# Patient Record
Sex: MALE | Race: WHITE | NOT HISPANIC OR LATINO | Employment: STUDENT | URBAN - METROPOLITAN AREA
[De-identification: names, ages, dates, MRNs, and addresses within clinical notes are randomized per-mention and may not be internally consistent; named-entity substitution may affect disease eponyms.]

---
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
## 2022-02-22 ENCOUNTER — PATIENT MESSAGE (OUTPATIENT)
Dept: RESEARCH | Facility: HOSPITAL | Age: 22
End: 2022-02-22
Payer: COMMERCIAL

## 2022-09-22 ENCOUNTER — HOSPITAL ENCOUNTER (EMERGENCY)
Facility: OTHER | Age: 22
Discharge: HOME | End: 2022-09-22
Attending: EMERGENCY MEDICINE
Payer: COMMERCIAL

## 2022-09-22 VITALS
DIASTOLIC BLOOD PRESSURE: 76 MMHG | HEART RATE: 93 BPM | HEIGHT: 72 IN | SYSTOLIC BLOOD PRESSURE: 137 MMHG | OXYGEN SATURATION: 98 % | RESPIRATION RATE: 18 BRPM | BODY MASS INDEX: 22.35 KG/M2 | TEMPERATURE: 98 F | WEIGHT: 165 LBS

## 2022-09-22 DIAGNOSIS — H92.03 OTALGIA OF BOTH EARS: ICD-10-CM

## 2022-09-22 DIAGNOSIS — H61.23 BILATERAL IMPACTED CERUMEN: ICD-10-CM

## 2022-09-22 DIAGNOSIS — J32.9 SINUSITIS, UNSPECIFIED CHRONICITY, UNSPECIFIED LOCATION: ICD-10-CM

## 2022-09-22 DIAGNOSIS — J02.9 PHARYNGITIS, UNSPECIFIED ETIOLOGY: Primary | ICD-10-CM

## 2022-09-22 LAB
CTP QC/QA: YES
CTP QC/QA: YES
GROUP A STREP, MOLECULAR: NEGATIVE
POC MOLECULAR INFLUENZA A AGN: NEGATIVE
POC MOLECULAR INFLUENZA B AGN: NEGATIVE
SARS-COV-2 RDRP RESP QL NAA+PROBE: NEGATIVE

## 2022-09-22 PROCEDURE — U0002 COVID-19 LAB TEST NON-CDC: HCPCS | Performed by: NURSE PRACTITIONER

## 2022-09-22 PROCEDURE — 87502 INFLUENZA DNA AMP PROBE: CPT

## 2022-09-22 PROCEDURE — 99284 EMERGENCY DEPT VISIT MOD MDM: CPT | Mod: 25

## 2022-09-22 PROCEDURE — 87651 STREP A DNA AMP PROBE: CPT | Performed by: NURSE PRACTITIONER

## 2022-09-22 RX ORDER — AMOXICILLIN AND CLAVULANATE POTASSIUM 875; 125 MG/1; MG/1
1 TABLET, FILM COATED ORAL EVERY 12 HOURS
Qty: 14 TABLET | Refills: 0 | Status: SHIPPED | OUTPATIENT
Start: 2022-09-22 | End: 2022-09-29

## 2022-09-22 RX ORDER — FLUTICASONE PROPIONATE 50 MCG
2 SPRAY, SUSPENSION (ML) NASAL DAILY
Qty: 9.9 ML | Refills: 0 | Status: SHIPPED | OUTPATIENT
Start: 2022-09-22

## 2022-09-23 NOTE — ED TRIAGE NOTES
Pt arrived to ED with c/o ear pain, soar throat for a week. Pt denies any recent trauma. Pt AAOx4, NAD noted.

## 2022-09-23 NOTE — ED PROVIDER NOTES
Encounter Date: 9/22/2022       History     Chief Complaint   Patient presents with    Otalgia     Pt c.o left earache onset 1 week ago. Pt states it feels like it is clogged up. Pt c.o sore throat and headache.  AAO x 3 nadn skin w.d     COVID-19 Concerns     Chief complaint: Ear pain    22-year-old male presents for evaluation of bilateral ear pain, worse on the left.  Also reports nasal congestion, runny nose, and sore throat.  Symptoms have been present for almost 2 weeks.  No fever.  No cough.    This is the extent of patient's complaints for this ER encounter.     The history is provided by the patient.   Review of patient's allergies indicates:  No Known Allergies  No past medical history on file.  No past surgical history on file.  No family history on file.  Social History     Tobacco Use    Smoking status: Never   Substance Use Topics    Alcohol use: Yes    Drug use: Never     Review of Systems   Constitutional:  Negative for fever.   HENT:  Positive for congestion, ear pain, rhinorrhea, sinus pressure and sore throat. Negative for ear discharge.    Respiratory:  Negative for cough and shortness of breath.    Cardiovascular:  Negative for chest pain.   Gastrointestinal:  Negative for abdominal pain.   Genitourinary:  Negative for difficulty urinating.   Musculoskeletal:  Negative for arthralgias, back pain, myalgias and neck pain.   Skin:  Negative for rash and wound.   Neurological:  Positive for headaches. Negative for weakness.   Psychiatric/Behavioral: Negative.       Physical Exam     Initial Vitals [09/22/22 1851]   BP Pulse Resp Temp SpO2   137/76 93 18 98.4 °F (36.9 °C) 98 %      MAP       --         Physical Exam    Nursing note and vitals reviewed.  Constitutional: No distress.   HENT:   Head: Normocephalic and atraumatic.   Nose: Rhinorrhea present.   Mouth/Throat: Mucous membranes are normal. Posterior oropharyngeal erythema present. No oropharyngeal exudate, posterior oropharyngeal edema or  tonsillar abscesses.   Unable to visualize bilateral TMs due to cerumen impaction.   Eyes: Conjunctivae, EOM and lids are normal. Right pupil is round. Left pupil is round. Pupils are equal.   Neck: Neck supple.   Cardiovascular:  Normal rate, regular rhythm and normal heart sounds.           Pulmonary/Chest: Effort normal and breath sounds normal. No respiratory distress.   Musculoskeletal:         General: Normal range of motion.      Cervical back: Neck supple.     Neurological: He is alert and oriented to person, place, and time. He has normal strength.   Skin: Skin is warm and dry. No rash noted.   Psychiatric: He has a normal mood and affect. His behavior is normal.       ED Course   Procedures  Labs Reviewed   GROUP A STREP, MOLECULAR   SARS-COV-2 RDRP GENE   POCT INFLUENZA A/B MOLECULAR          Imaging Results    None          Medications - No data to display  Medical Decision Making:   Initial Assessment:   22-year-old male presents for evaluation ear pain, nasal congestion, and sore throat for almost 2 weeks.  ED Management:  Negative flu, COVID, strep.  Erythema noted to the posterior throat without swelling.  No signs of peritonsillar abscess.  No difficulty swallowing or handling secretions.  No shortness of breath.  Bilateral cerumen impaction noted, unable to view TMs.    Given symptoms have been present for greater than 10 days, concerns for bacterial sinusitis.  Plan to treat with Augmentin antibiotics for the treatment of sinusitis and pharyngitis.  This will provide additional coverage if acute otitis media is present, but unable to identify due to wax.  Additionally, Flonase prescribed.  Debrox ordered and patient educated on the importance of follow-up with PCP or urgent care for wax removal.  Magic mouthwash as needed for throat pain.    Patient/caregiver voices understanding and feels comfortable with discharge plan.    The patient/caregiver acknowledges that close follow up with medical  provider is required. Instructed to follow up with PCP within 2 days. Patient/caregiver was given specific return precautions. The patient/caregiver agrees to comply with all instruction and directions given in the ER.                         Clinical Impression:   Final diagnoses:  [H92.03] Otalgia of both ears  [H61.23] Bilateral impacted cerumen  [J32.9] Sinusitis, unspecified chronicity, unspecified location  [J02.9] Pharyngitis, unspecified etiology (Primary)      ED Disposition Condition    Discharge Stable          ED Prescriptions       Medication Sig Dispense Start Date End Date Auth. Provider    amoxicillin-clavulanate 875-125mg (AUGMENTIN) 875-125 mg per tablet Take 1 tablet by mouth every 12 (twelve) hours. for 7 days 14 tablet 9/22/2022 9/29/2022 Oneyda Shields NP    (Magic mouthwash) 1:1:1 diphenhydramine(Benadryl) 12.5mg/5ml liq, aluminum & magnesium hydroxide-simethicone (Maalox), LIDOcaine viscous 2% Swish and spit 5 mLs every 6 (six) hours as needed (mouth/throat pain). 90 mL 9/22/2022 -- Oneyda Shields NP    fluticasone propionate (FLONASE) 50 mcg/actuation nasal spray 2 sprays (100 mcg total) by Each Nostril route once daily. 9.9 mL 9/22/2022 -- Oneyda Shields NP    carbamide peroxide (DEBROX) 6.5 % otic solution Place 5 drops into both ears as needed (Ear wax impaction). May use 2 times per day up to 4 days. 15 mL 9/22/2022 -- Oneyda Shields NP          Follow-up Information       Follow up With Specialties Details Why Contact Info    Primary care  Schedule an appointment as soon as possible for a visit in 2 days               Oneyda Shields NP  09/22/22 2123

## 2022-09-23 NOTE — DISCHARGE INSTRUCTIONS
Your flu swab, COVID swab, and strep swab were negative.    I think you likely have a sinus infection.  Because your symptoms have been ongoing for more than 10 days, antibiotics are prescribed at discharge.  Augmentin twice a day x7 days. Flonase also prescribed.     Magic mouthwash for throat pain.    Debrox ear drops can help with wax impaction.  They also have over-the-counter kits that may help with this.  If you are unable to disimpact ear wax, follow-up with primary care or urgent care.    Follow up with PCP in 24-48 hours. Return to ER with worsening of symptoms.     Over the counter medications, Tylenol or ibuprofen, for pain as needed as directed on package insert. Drink plenty fluids. Get plenty rest. Wash hands frequently.

## 2022-10-31 ENCOUNTER — HOSPITAL ENCOUNTER (EMERGENCY)
Facility: OTHER | Age: 22
Discharge: HOME | End: 2022-10-31
Attending: EMERGENCY MEDICINE
Payer: COMMERCIAL

## 2022-10-31 VITALS
RESPIRATION RATE: 16 BRPM | HEIGHT: 72 IN | SYSTOLIC BLOOD PRESSURE: 121 MMHG | HEART RATE: 96 BPM | DIASTOLIC BLOOD PRESSURE: 70 MMHG | OXYGEN SATURATION: 97 % | TEMPERATURE: 99 F | BODY MASS INDEX: 23.03 KG/M2 | WEIGHT: 170 LBS

## 2022-10-31 DIAGNOSIS — J02.9 VIRAL PHARYNGITIS: Primary | ICD-10-CM

## 2022-10-31 PROCEDURE — 63600175 PHARM REV CODE 636 W HCPCS: Performed by: EMERGENCY MEDICINE

## 2022-10-31 PROCEDURE — 87635 SARS-COV-2 COVID-19 AMP PRB: CPT | Performed by: EMERGENCY MEDICINE

## 2022-10-31 PROCEDURE — 99284 EMERGENCY DEPT VISIT MOD MDM: CPT

## 2022-10-31 PROCEDURE — 87651 STREP A DNA AMP PROBE: CPT | Performed by: EMERGENCY MEDICINE

## 2022-10-31 PROCEDURE — 96372 THER/PROPH/DIAG INJ SC/IM: CPT | Performed by: EMERGENCY MEDICINE

## 2022-10-31 RX ORDER — DEXAMETHASONE SODIUM PHOSPHATE 4 MG/ML
8 INJECTION, SOLUTION INTRA-ARTICULAR; INTRALESIONAL; INTRAMUSCULAR; INTRAVENOUS; SOFT TISSUE
Status: COMPLETED | OUTPATIENT
Start: 2022-10-31 | End: 2022-10-31

## 2022-10-31 RX ORDER — IBUPROFEN 800 MG/1
800 TABLET ORAL EVERY 6 HOURS PRN
Qty: 30 TABLET | Refills: 0 | Status: SHIPPED | OUTPATIENT
Start: 2022-10-31

## 2022-10-31 RX ADMIN — DEXAMETHASONE SODIUM PHOSPHATE 8 MG: 4 INJECTION INTRA-ARTICULAR; INTRALESIONAL; INTRAMUSCULAR; INTRAVENOUS; SOFT TISSUE at 05:10

## 2022-10-31 NOTE — FIRST PROVIDER EVALUATION
Emergency Department TeleTriage Encounter Note      CHIEF COMPLAINT    Chief Complaint   Patient presents with    Influenza     Patient reports to ED  with complaints of body aches / chills , cough and congestion , sore throat and fatigue x 1 day. Patient afebrile at present. NAD noted.        VITAL SIGNS   Initial Vitals [10/31/22 1428]   BP Pulse Resp Temp SpO2   121/70 96 16 99.3 °F (37.4 °C) 97 %      MAP       --            ALLERGIES    Review of patient's allergies indicates:  No Known Allergies    PROVIDER TRIAGE NOTE  TeleTriage Note: Juice Burk, a nontoxic/well appearing, 22 y.o. male, presented to the ED with c/o sore throat, chills and body aches since yesterday.     All ED beds are full at present; patient notified of this status.  Patient seen and medically screened by Nurse Practitioner via teletriage. Orders initiated at triage to expedite care.  Patient is stable to return to the waiting room and will be placed in an ED bed when available.  Care will be transferred to an alternate provider when patient has been placed in an Exam Room from the [facility] for physical exam, additional orders, and disposition.  2:39 PM Sunita Cheatham DNP, FNP-C        ORDERS  Labs Reviewed   SARS-COV-2 RDRP GENE   POCT INFLUENZA A/B MOLECULAR       ED Orders (720h ago, onward)      Start Ordered     Status Ordering Provider    10/31/22 1441 10/31/22 1440  POCT COVID-19 Rapid Screening  Once         Ordered SUNITA CHEATHAM    10/31/22 1441 10/31/22 1440  POCT Influenza A/B Molecular  Once         Ordered SUNITA CHEATHAM              Virtual Visit Note: The provider triage portion of this emergency department evaluation and documentation was performed via [employer], a HIPAA-compliant telemedicine application, in concert with a tele-presenter in the room. A face to face patient evaluation with one of my colleagues will occur once the patient is placed in an emergency department room.      DISCLAIMER: This note was  prepared with [employer] voice recognition transcription software. Garbled syntax, mangled pronouns, and other bizarre constructions may be attributed to that software system.

## 2022-10-31 NOTE — ED TRIAGE NOTES
"Present to the er with c/o " I have an extremely sore throat, I have chills, I have body pain" " they said they think I have strep throat" symptom since 2 days, rates pain 9/10  "

## 2022-10-31 NOTE — ED PROVIDER NOTES
Encounter Date: 10/31/2022    SCRIBE #1 NOTE: I, Sola Bansal, am scribing for, and in the presence of,  Sean Vidal II, MD.     History     Chief Complaint   Patient presents with    Influenza     Patient reports to ED  with complaints of body aches / chills , cough and congestion , sore throat and fatigue x 1 day. Patient afebrile at present. NAD noted.      Time seen by provider: 3:02 PM    This is a 22 y.o. male who presents with complaint of sore throat for the past day. The patient also reports fevers, chills, congestion, cough, dyspnea, and myalgias. He denies associated nausea or vomiting. He also denies any sick contacts. The patient states that he is vaccinated against COVID-19. He denies any allergies to medications.     The history is provided by the patient.   Review of patient's allergies indicates:  No Known Allergies  History reviewed. No pertinent past medical history.  History reviewed. No pertinent surgical history.  History reviewed. No pertinent family history.  Social History     Tobacco Use    Smoking status: Never   Substance Use Topics    Alcohol use: Yes    Drug use: Never     Review of Systems   Constitutional:  Positive for chills and fever. Negative for diaphoresis.   HENT:  Positive for congestion and sore throat.    Eyes:  Negative for photophobia and visual disturbance.   Respiratory:  Positive for cough and shortness of breath.    Cardiovascular:  Negative for chest pain and leg swelling.   Gastrointestinal:  Negative for abdominal pain, blood in stool, constipation, diarrhea, nausea and vomiting.   Genitourinary:  Negative for dysuria, frequency, hematuria and urgency.   Musculoskeletal:  Positive for myalgias. Negative for neck pain and neck stiffness.   Skin:  Negative for rash and wound.   Neurological:  Negative for weakness, light-headedness, numbness and headaches.   Hematological:  Does not bruise/bleed easily.   Psychiatric/Behavioral:  Negative for confusion and  suicidal ideas.      Physical Exam     Initial Vitals [10/31/22 1428]   BP Pulse Resp Temp SpO2   121/70 96 16 99.3 °F (37.4 °C) 97 %      MAP       --         Physical Exam    Nursing note and vitals reviewed.  Constitutional: He appears well-developed and well-nourished. He is not diaphoretic. No distress.   Well appearing.   HENT:   Head: Normocephalic and atraumatic.   TM's are normal. Tonsils are erythematous. Patches of whiteish exudate. Uvula is midline. No asymmetry of peritonsillar area. Bilateral cervical adenopothy.   Eyes: Conjunctivae and EOM are normal. Pupils are equal, round, and reactive to light.   Neck: Neck supple.   Normal range of motion.  Cardiovascular:  Normal rate, regular rhythm and intact distal pulses.     Exam reveals no gallop and no friction rub.       No murmur heard.  Pulmonary/Chest: Breath sounds normal. No respiratory distress. He has no wheezes. He has no rhonchi. He has no rales.   Abdominal: Abdomen is soft. There is no abdominal tenderness.   Musculoskeletal:         General: No tenderness or edema. Normal range of motion.      Cervical back: Normal range of motion and neck supple.     Neurological: He is alert and oriented to person, place, and time.   Skin: Skin is warm and dry.   Psychiatric: He has a normal mood and affect. His behavior is normal. Judgment and thought content normal.       ED Course   Procedures  Labs Reviewed   GROUP A STREP, MOLECULAR   POCT INFLUENZA A/B MOLECULAR   SARS-COV-2 RDRP GENE    Narrative:     This test utilizes isothermal nucleic acid amplification   technology to detect the SARS-CoV-2 RdRp nucleic acid segment.   The analytical sensitivity (limit of detection) is 125 genome   equivalents/mL.   A POSITIVE result implies infection with the SARS-CoV-2 virus;   the patient is presumed to be contagious.     A NEGATIVE result means that SARS-CoV-2 nucleic acids are not   present above the limit of detection. A NEGATIVE result should be   treated  as presumptive. It does not rule out the possibility of   COVID-19 and should not be the sole basis for treatment decisions.   If COVID-19 is strongly suspected based on clinical and exposure   history, re-testing using an alternate molecular assay should be   considered.   This test is only for use under the Food and Drug   Administration s Emergency Use Authorization (EUA).   Commercial kits are provided by [employer].   Performance characteristics of the EUA have been independently   verified by [facility] Department of   Pathology and Laboratory Medicine.   _________________________________________________________________   The authorized Fact Sheet for Healthcare Providers and the authorized Fact   Sheet for Patients of the ID NOW COVID-19 are available on the FDA   website:     https://www.fda.gov/media/859893/download  https://www.fda.gov/media/270970/download                  Imaging Results    None          Medications   dexAMETHasone injection 8 mg (8 mg Intramuscular Given 10/31/22 1710)     Medical Decision Making:   History:   Old Medical Records: I decided to obtain old medical records.  Clinical Tests:   Lab Tests: Ordered and Reviewed     Healthy young male who presents with primary complaint of sore throat as well as symptoms of URI.  Currently afebrile, stable vital signs.  Exam does show tonsillar erythema in small patches of exudate, but does not have clinical appearance of a peritonsillar abscess.  Discussed possible presumptive treatment for exudate of pharyngitis, possibly streptococcal but patient preferred diagnostic testing.  COVID, influenza, strep screen are negative.  Symptoms therefore may be more likely due to a viral pharyngitis.  Patient will be given Decadron for symptom relief.  Discussed fever care and return precautions.  Patient is aware that if symptoms persist or progress, he should be re-evaluated as he may then need treatment for streptococcal  etiology.       Scribe Attestation:   Scribe #1: I performed the above scribed service and the documentation accurately describes the services I performed. I attest to the accuracy of the note.          Physician Attestation for Scribe: I, TLH, reviewed documentation as scribed in my presence, which is both accurate and complete.           Clinical Impression:   Final diagnoses:  [J02.9] Viral pharyngitis (Primary)      ED Disposition Condition    Discharge Stable          ED Prescriptions       Medication Sig Dispense Start Date End Date Auth. Provider    ibuprofen (ADVIL,MOTRIN) 800 MG tablet Take 1 tablet (800 mg total) by mouth every 6 (six) hours as needed for Pain. 30 tablet 10/31/2022 -- Sean Vidal II, MD          Follow-up Information       Follow up With Specialties Details Why Contact Info    Primary Care Clinic  Schedule an appointment as soon as possible for a visit in 4 days               Sean Vidal II, MD  11/01/22 2130

## 2023-09-14 NOTE — PROGRESS NOTES
Subjective:      Juice Burk is a 23 y.o. male who was self-referred for evaluation of his urinary symptoms.    The patient [place]lam[place] and [place]. He reported penile discharge and dysuria at the time. Treated with rocephin and doxycycline. Reports negative testing x 2 after completing antibiotics.    Today he reports dysuria, slow urinary stream at times, intermittency, and dribbling of urine at times. Denies straining to void. Denies worsening frequency/urgency. Denies gross hematuria and fever/chills. Denies penile discharge.   Drinks mostly water.     The following portions of the patient's history were reviewed and updated as appropriate: allergies, current medications, past family history, past medical history, past social history, past surgical history and problem list.    Review of Systems  Constitutional: no fever or chills  ENT: no nasal congestion or sore throat  Respiratory: no cough or shortness of breath  Cardiovascular: no chest pain or palpitations  Gastrointestinal: no nausea or vomiting, tolerating diet  Genitourinary: as per HPI  Hematologic/Lymphatic: no easy bruising or lymphadenopathy  Musculoskeletal: no arthralgias or myalgias  Neurological: no seizures or tremors  Behavioral/Psych: no auditory or visual hallucinations     Objective:   Vitals:   Vitals:    09/15/23 0723   BP: 118/69   Pulse: 71     Physical Exam   General: alert and oriented, no acute distress  Head: normocephalic, atraumatic  Neck: supple, normal ROM  Respiratory: Symmetric expansion, non-labored breathing  Cardiovascular: regular rate and rhythm  Abdomen: soft, non tender, non distended  Genitourinary: deferred   Skin: normal coloration and turgor, no rashes, no suspicious skin lesions noted  Neuro: alert and oriented x3, no gross deficits  Psych: normal judgment and insight, normal mood/affect, and non-anxious    Lab Review   Urinalysis demonstrates negative for all components  No  "results found for: "WBC", "HGB", "HCT", "MCV", "PLT"  No results found for: "CREATININE", "BUN"  No results found for: "PSA"  Imaging   None    Assessment:     1. Dysuria    2. History of sexually transmitted disease      Plan:   Juice was seen today for burning when urinate.    Diagnoses and all orders for this visit:    Dysuria  -     Ureaplasma PCR Urine  -     Mycoplasma genitalium Molecular Detection, PCR Urine  -     C. trachomatis/N. gonorrhoeae by AMP DNA [facility]; Urine  -     Trichomonas vaginalis, RNA, Qual, Urine  -     phenazopyridine (PYRIDIUM) 200 MG tablet; Take 1 tablet (200 mg total) by mouth 3 (three) times daily as needed for Pain.  -     tamsulosin (FLOMAX) 0.4 mg Cap; Take 1 capsule (0.4 mg total) by mouth once daily.    History of sexually transmitted disease    Plan:  --STD testing listed above, will notify with results   --Trial of flomax and pyridium for symptoms  --If testing is negative we will consider a cysto to evaluate for stricture       "

## 2023-09-15 ENCOUNTER — OFFICE VISIT (OUTPATIENT)
Dept: UROLOGY | Facility: CLINIC | Age: 23
End: 2023-09-15
Payer: COMMERCIAL

## 2023-09-15 VITALS
DIASTOLIC BLOOD PRESSURE: 69 MMHG | HEIGHT: 72 IN | BODY MASS INDEX: 22.63 KG/M2 | SYSTOLIC BLOOD PRESSURE: 118 MMHG | WEIGHT: 167.06 LBS | HEART RATE: 71 BPM | OXYGEN SATURATION: 97 %

## 2023-09-15 DIAGNOSIS — Z86.19 HISTORY OF SEXUALLY TRANSMITTED DISEASE: ICD-10-CM

## 2023-09-15 DIAGNOSIS — R30.0 DYSURIA: ICD-10-CM

## 2023-09-15 DIAGNOSIS — R30.0 DYSURIA: Primary | ICD-10-CM

## 2023-09-15 PROCEDURE — 99203 OFFICE O/P NEW LOW 30 MIN: CPT | Mod: S$GLB,,, | Performed by: NURSE PRACTITIONER

## 2023-09-15 PROCEDURE — 99203 PR OFFICE/OUTPT VISIT, NEW, LEVL III, 30-44 MIN: ICD-10-PCS | Mod: S$GLB,,, | Performed by: NURSE PRACTITIONER

## 2023-09-15 PROCEDURE — 87661 TRICHOMONAS VAGINALIS AMPLIF: CPT | Performed by: NURSE PRACTITIONER

## 2023-09-15 PROCEDURE — 87591 N.GONORRHOEAE DNA AMP PROB: CPT | Performed by: NURSE PRACTITIONER

## 2023-09-15 PROCEDURE — 87563 M. GENITALIUM AMP PROBE: CPT | Performed by: NURSE PRACTITIONER

## 2023-09-15 PROCEDURE — 87798 DETECT AGENT NOS DNA AMP: CPT | Mod: 59 | Performed by: NURSE PRACTITIONER

## 2023-09-15 RX ORDER — TAMSULOSIN HYDROCHLORIDE 0.4 MG/1
0.4 CAPSULE ORAL DAILY
Qty: 30 CAPSULE | Refills: 0 | Status: SHIPPED | OUTPATIENT
Start: 2023-09-15 | End: 2023-09-15 | Stop reason: SDUPTHER

## 2023-09-15 RX ORDER — TAMSULOSIN HYDROCHLORIDE 0.4 MG/1
0.4 CAPSULE ORAL DAILY
Qty: 90 CAPSULE | Refills: 0 | Status: SHIPPED | OUTPATIENT
Start: 2023-09-15 | End: 2023-12-14

## 2023-09-15 RX ORDER — PHENAZOPYRIDINE HYDROCHLORIDE 200 MG/1
200 TABLET, FILM COATED ORAL 3 TIMES DAILY PRN
Qty: 30 TABLET | Refills: 0 | Status: SHIPPED | OUTPATIENT
Start: 2023-09-15 | End: 2023-09-25

## 2023-09-16 LAB
C TRACH DNA SPEC QL NAA+PROBE: NOT DETECTED
N GONORRHOEA DNA SPEC QL NAA+PROBE: NOT DETECTED

## 2023-09-17 LAB
M GENITALIUM DNA UR QL NAA+PROBE: NEGATIVE
SPECIMEN SOURCE: NORMAL
SPECIMEN SOURCE: NORMAL
T VAGINALIS RRNA SPEC QL NAA+PROBE: NEGATIVE

## 2023-09-18 DIAGNOSIS — R30.0 DYSURIA: Primary | ICD-10-CM

## 2023-09-18 LAB
SPECIMEN SOURCE: NORMAL
U PARVUM DNA SPEC QL NAA+PROBE: NEGATIVE
U UREALYTICUM DNA SPEC QL NAA+PROBE: NEGATIVE

## 2023-09-25 ENCOUNTER — PROCEDURE VISIT (OUTPATIENT)
Dept: UROLOGY | Facility: CLINIC | Age: 23
End: 2023-09-25
Attending: UROLOGY
Payer: COMMERCIAL

## 2023-09-25 VITALS
HEART RATE: 70 BPM | SYSTOLIC BLOOD PRESSURE: 121 MMHG | WEIGHT: 172.5 LBS | RESPIRATION RATE: 22 BRPM | DIASTOLIC BLOOD PRESSURE: 67 MMHG | OXYGEN SATURATION: 100 % | BODY MASS INDEX: 23.36 KG/M2 | HEIGHT: 72 IN

## 2023-09-25 DIAGNOSIS — R30.0 DYSURIA: ICD-10-CM

## 2023-09-25 PROCEDURE — 52000 CYSTOSCOPY: ICD-10-PCS | Mod: S$GLB,,, | Performed by: UROLOGY

## 2023-09-25 PROCEDURE — 52000 CYSTOURETHROSCOPY: CPT | Mod: S$GLB,,, | Performed by: UROLOGY

## 2023-09-25 RX ORDER — CIPROFLOXACIN 500 MG/1
500 TABLET ORAL
Status: COMPLETED | OUTPATIENT
Start: 2023-09-25 | End: 2023-09-25

## 2023-09-25 RX ORDER — LIDOCAINE HYDROCHLORIDE 20 MG/ML
JELLY TOPICAL
Status: COMPLETED | OUTPATIENT
Start: 2023-09-25 | End: 2023-09-25

## 2023-09-25 RX ADMIN — CIPROFLOXACIN 500 MG: 500 TABLET ORAL at 11:09

## 2023-09-25 RX ADMIN — LIDOCAINE HYDROCHLORIDE: 20 JELLY TOPICAL at 11:09

## 2023-09-25 NOTE — PROCEDURES
Juice Burk is a 23 y.o. male patient.    Pulse: 70 (09/25/23 1127)  Resp: (!) 22 (09/25/23 1127)  BP: 121/67 (09/25/23 1127)  SpO2: 100 % (09/25/23 1127)  Weight: 78.3 kg (172 lb 8.2 oz) (09/25/23 1127)  Height: 6' (182.9 cm) (09/25/23 1127)       Cystoscopy    Date/Time: 9/25/2023 2:17 PM    Performed by: Yousif Vora MD  Authorized by: Brianna Freed NP    Prep: patient was prepped and draped in usual sterile fashion    Local anesthesia used?: Yes    Anesthesia:  Lidocaine jelly  Local anesthetic:  Lidocaine 2% topical gel  Indications comment:  Hx of STI. Obstructive voiding symptoms  Position:  Supine  Anesthesia:  Lidocaine jelly  Preparation: Patient was prepped and draped in usual sterile fashion    Scope type:  Flexible cystoscope  External exam normal: Yes    Urethra normal: Yes    Prostate normal: Yes    Bladder neck normal: Yes    Bladder normal: Yes     patient tolerated the procedure well with no immediate complications  Comments:      Normal cysto. No urethral stricture noted.  F/u as needed      9/25/2023

## 2023-10-09 NOTE — PROGRESS NOTES
ALLERGY & [facility]MM[facility]OLOGY CLINIC   HISTORY OF PRESENT ILLNESS   Referral from: Aaareferral Self  CC:   Chief Complaint   Patient presents with    Urticaria       HPI: Juice Burk is a 23 y.o. male  Discover the source and a solution to the hives and itching I have been suffering from since approximately June. Are itchy, move around, don't leave any bruising or scar, went away for a few weeks and now back. PCP in [place] (home) gave a steroid taper (medrol) and allergy pills, that helped. This summer had GC/C treated to Moise x 3 which happened 5/2023. Wonders if hives are a result of infection. Do not leave katie or bruise, no fevers, no LAD, no other infections, are very itchy and move around red blotchy and raised with dermatographia. Not triggered by sun, water, cold/rewarming, sweat. No anaphylaxis, no angioedema. No association with NSAIDs, alcohol.     Blood in stool? No  Easy bleeding or bruising? No  Abdominal pain? No    Asthma/bronchitis: No  Rhinitis: Sometimes gets some congestion, no particular triggers  Urticaria: Yes as above  Drug Allergies: Review of patient's allergies indicates:  No Known Allergies      MEDICAL HISTORY   MedHx:   There is no problem list on file for this patient.      SurgHx:  No past surgical history on file.    Medications:   Current Outpatient Medications on File Prior to Visit   Medication Sig Dispense Refill    (Magic mouthwash) 1:1:1 diphenhydramine(Benadryl) 12.5mg/5ml liq, aluminum & magnesium hydroxide-simethicone (Maalox), LIDOcaine viscous 2% Swish and spit 5 mLs every 6 (six) hours as needed (mouth/throat pain). (Patient not taking: Reported on 9/15/2023) 90 mL 0    carbamide peroxide (DEBROX) 6.5 % otic solution Place 5 drops into both ears as needed (Ear wax impaction). May use 2 times per day up to 4 days. (Patient not taking: Reported on 9/15/2023) 15 mL 0    fluticasone propionate (FLONASE) 50 mcg/actuation nasal spray 2 sprays (100 mcg total) by Each Nostril route  once daily. (Patient not taking: Reported on 9/15/2023) 9.9 mL 0    ibuprofen (ADVIL,MOTRIN) 800 MG tablet Take 1 tablet (800 mg total) by mouth every 6 (six) hours as needed for Pain. (Patient not taking: Reported on 9/15/2023) 30 tablet 0    tamsulosin (FLOMAX) 0.4 mg Cap Take 1 capsule (0.4 mg total) by mouth once daily. (Patient not taking: Reported on 10/10/2023) 90 capsule 0     No current facility-administered medications on file prior to visit.      PHYSICAL EXAM     VS: Ht 6' (1.829 m)   Wt 78.2 kg (172 lb 6.4 oz)   BMI 23.38 kg/m²   GENERAL: NAD, well nourished, well appearing  EYES: no conjunctival injection, no discharge, no infraorbital shiners  EARS: external auditory canals normal B/L  ORAL: MMM  LUNGS: no increased WOB  EXTREMITIES: no cyanosis, no clubbing  DERM: +urticarial wheals on erythema base that move around during encounter on neck, arms, face. +photos of dermatographia     ASSESSMENT & PLAN     Juice Burk is a 23 y.o. male with     Chronic idiopathic/spontaneous urticaria (CSU)  - Could be post-infectious, although 95% idiopathic.  - fexofenadine (ALLEGRA) 180 MG tablet; Take 1 tablet (180 mg total) by mouth 2 (two) times a day.  Dispense: 60 tablet; Refill: 11    Dermatographia  - Often associated with CSU    Intermittent rhinitis  - Thinks due to allergies, not interested in testing or nose sprays at this time as main concern is urticaria. Can use PRN oral antihistamine.     - Chronic idiopathic/spontaneous urticaria (hives)   Chronic spontaneous urticaria (CSU) is a type of chronic hives (urticaria) that occurs without a known cause. Hives are raised, red, itchy welts on the skin that can be triggered by a variety of factors, including allergies, infections, and stress. In chronic spontaneous urticaria, hives persist for more than 6 weeks and cannot be attributed to a specific trigger.    The exact cause of chronic spontaneous urticaria is unknown, but it is thought to be related to  an overactive immune system that produces antibodies that target the skin. Sometimes this can happen after an infection such as a cold. Infrequently people with chronic spontaneous urticaria may have other autoimmune conditions, such as thyroid disease.    Symptoms of chronic spontaneous urticaria include hives, itching, and swelling of the skin. The hives can be widespread or localized to certain areas of the body and can come and go without a clear pattern. In severe cases, hives can be accompanied by angioedema, which is swelling of the deeper layers of the skin, including the face, lips, and tongue.    Most patients can manage medications on their own, increasing and deceasing the medications over time. You may notice symptoms worsen with stress, or NSAID use (example: ibuprofen, motrin).    There are two types of medications that are used to treat this:  H1-blockers are typical antihistmaines: Zyrtec (cetirizine), Claritin (loratadine), and Allegra (fexofenadine) are all good choices.  H-2 blockers are another type of antihistmaine, often used for stomach problems: Pepcid (famotidine) is a good choice.    When there is a significant flare of hives, use: H-1 blocker 2 tabs twice daily, can wean down once controlled (for example to 1 tab twice a day, then 1 tab daily, then off)    Many patients require long-term treatment at one of these stages. If you attempt to wean medications and the hives become bothersome, return to the last tolerated medication schedule. If you are still having hives even though you are on H-1 blockers, return to the clinic for additional evaluation (you will probably need blood drawn) and consideration of prescription medications called omalizumab.      Follow up: 2 weeks hives

## 2023-10-10 ENCOUNTER — OFFICE VISIT (OUTPATIENT)
Dept: ALLERGY | Facility: CLINIC | Age: 23
End: 2023-10-10
Payer: COMMERCIAL

## 2023-10-10 VITALS — HEIGHT: 72 IN | WEIGHT: 172.38 LBS | BODY MASS INDEX: 23.35 KG/M2

## 2023-10-10 DIAGNOSIS — L50.3 DERMATOGRAPHIA: ICD-10-CM

## 2023-10-10 DIAGNOSIS — L50.1 CHRONIC IDIOPATHIC URTICARIA: Primary | ICD-10-CM

## 2023-10-10 DIAGNOSIS — J31.0 RHINITIS, UNSPECIFIED TYPE: ICD-10-CM

## 2023-10-10 PROCEDURE — 99999 PR PBB SHADOW E&M-EST. PATIENT-LVL III: CPT | Mod: PBBFAC,,, | Performed by: STUDENT IN AN ORGANIZED HEALTH CARE EDUCATION/TRAINING PROGRAM

## 2023-10-10 PROCEDURE — 99204 PR OFFICE/OUTPT VISIT, NEW, LEVL IV, 45-59 MIN: ICD-10-PCS | Mod: S$GLB,,, | Performed by: STUDENT IN AN ORGANIZED HEALTH CARE EDUCATION/TRAINING PROGRAM

## 2023-10-10 PROCEDURE — 99204 OFFICE O/P NEW MOD 45 MIN: CPT | Mod: S$GLB,,, | Performed by: STUDENT IN AN ORGANIZED HEALTH CARE EDUCATION/TRAINING PROGRAM

## 2023-10-10 PROCEDURE — 99999 PR PBB SHADOW E&M-EST. PATIENT-LVL III: ICD-10-PCS | Mod: PBBFAC,,, | Performed by: STUDENT IN AN ORGANIZED HEALTH CARE EDUCATION/TRAINING PROGRAM

## 2023-10-10 RX ORDER — MINERAL OIL
180 ENEMA (ML) RECTAL 2 TIMES DAILY
Qty: 60 TABLET | Refills: 11 | Status: SHIPPED | OUTPATIENT
Start: 2023-10-10 | End: 2024-10-09

## 2023-11-06 ENCOUNTER — OFFICE VISIT (OUTPATIENT)
Dept: UROLOGY | Facility: CLINIC | Age: 23
End: 2023-11-06
Attending: UROLOGY
Payer: COMMERCIAL

## 2023-11-06 VITALS
OXYGEN SATURATION: 97 % | HEIGHT: 73 IN | DIASTOLIC BLOOD PRESSURE: 70 MMHG | BODY MASS INDEX: 22.42 KG/M2 | HEART RATE: 81 BPM | SYSTOLIC BLOOD PRESSURE: 118 MMHG | WEIGHT: 169.19 LBS

## 2023-11-06 DIAGNOSIS — N50.811 PAIN IN BOTH TESTICLES: Primary | ICD-10-CM

## 2023-11-06 DIAGNOSIS — N50.812 PAIN IN BOTH TESTICLES: Primary | ICD-10-CM

## 2023-11-06 PROCEDURE — 87591 N.GONORRHOEAE DNA AMP PROB: CPT | Performed by: UROLOGY

## 2023-11-06 PROCEDURE — 99214 OFFICE O/P EST MOD 30 MIN: CPT | Mod: S$GLB,,, | Performed by: UROLOGY

## 2023-11-06 PROCEDURE — 99214 PR OFFICE/OUTPT VISIT, EST, LEVL IV, 30-39 MIN: ICD-10-PCS | Mod: S$GLB,,, | Performed by: UROLOGY

## 2023-11-06 RX ORDER — PHENAZOPYRIDINE HYDROCHLORIDE 100 MG/1
100 TABLET, FILM COATED ORAL 3 TIMES DAILY PRN
Qty: 30 TABLET | Refills: 1 | Status: SHIPPED | OUTPATIENT
Start: 2023-11-06 | End: 2023-11-26

## 2023-11-06 NOTE — PROGRESS NOTES
"Subjective:      Juice Burk is a 23 y.o. male who returns today regarding his testicular pain.    Has had bilateral discomfort for 1-2 weeks. No there symptoms.    The following portions of the patient's history were reviewed and updated as appropriate: allergies, current medications, past family history, past medical history, past social history, past surgical history and problem list.    Review of Systems  A comprehensive multipoint review of systems was negative except as otherwise stated in the HPI.     Objective:   Vitals: /70 (BP Location: Right arm, Patient Position: Sitting, BP Method: Large (Automatic))   Pulse 81   Ht 6' 1" (1.854 m)   Wt 76.8 kg (169 lb 3.3 oz)   SpO2 97%   BMI 22.32 kg/m²     Physical Exam   General: alert and oriented, no acute distress  Respiratory: Symmetric expansion, non-labored breathing  Genitourinary: no penile lesions or discharge, no testicular masses, normal scrotum  Neuro: no gross deficits  Psych: normal judgment and insight, normal mood/affect, and non-anxious    Lab Review   Urinalysis demonstrates negative for all components      Assessment and Plan:     1. Pain in both testicles  -     C. trachomatis/N. gonorrhoeae by AMP DNA [facility]; Urine  -     Mycoplasma genitalium Molecular Detection, PCR Urine; Future; Expected date: 11/06/2023  -     Ureaplasma PCR Urine; Future; Expected date: 11/06/2023  -     US Scrotum And Testicles; Future; Expected date: 11/06/2023    Other orders  -     phenazopyridine (PYRIDIUM) 100 MG tablet; Take 1 tablet (100 mg total) by mouth 3 (three) times daily as needed for Pain.  Dispense: 30 tablet; Refill: 1        "

## 2023-11-07 LAB
C TRACH DNA SPEC QL NAA+PROBE: NOT DETECTED
N GONORRHOEA DNA SPEC QL NAA+PROBE: NOT DETECTED

## 2024-01-31 ENCOUNTER — OFFICE VISIT (OUTPATIENT)
Dept: URGENT CARE | Facility: CLINIC | Age: 24
End: 2024-01-31
Payer: COMMERCIAL

## 2024-01-31 VITALS
DIASTOLIC BLOOD PRESSURE: 76 MMHG | SYSTOLIC BLOOD PRESSURE: 120 MMHG | HEART RATE: 81 BPM | TEMPERATURE: 99 F | OXYGEN SATURATION: 98 % | HEIGHT: 73 IN | WEIGHT: 171.44 LBS | RESPIRATION RATE: 18 BRPM | BODY MASS INDEX: 22.72 KG/M2

## 2024-01-31 DIAGNOSIS — J06.9 URI WITH COUGH AND CONGESTION: Primary | ICD-10-CM

## 2024-01-31 DIAGNOSIS — R05.9 COUGH, UNSPECIFIED TYPE: ICD-10-CM

## 2024-01-31 LAB
CTP QC/QA: YES
CTP QC/QA: YES
POC MOLECULAR INFLUENZA A AGN: NEGATIVE
POC MOLECULAR INFLUENZA B AGN: NEGATIVE
SARS-COV-2 AG RESP QL IA.RAPID: NEGATIVE

## 2024-01-31 PROCEDURE — 87811 SARS-COV-2 COVID19 W/OPTIC: CPT | Mod: QW,S$GLB,, | Performed by: NURSE PRACTITIONER

## 2024-01-31 PROCEDURE — 99203 OFFICE O/P NEW LOW 30 MIN: CPT | Mod: S$GLB,,, | Performed by: NURSE PRACTITIONER

## 2024-01-31 PROCEDURE — 87502 INFLUENZA DNA AMP PROBE: CPT | Mod: QW,S$GLB,, | Performed by: NURSE PRACTITIONER

## 2024-01-31 RX ORDER — PROMETHAZINE HYDROCHLORIDE AND DEXTROMETHORPHAN HYDROBROMIDE 6.25; 15 MG/5ML; MG/5ML
5 SYRUP ORAL NIGHTLY PRN
Qty: 120 ML | Refills: 0 | Status: SHIPPED | OUTPATIENT
Start: 2024-01-31 | End: 2024-02-10

## 2024-01-31 RX ORDER — GUAIFENESIN, PSEUDOEPHEDRINE HYDROCHLORIDE 600; 60 MG/1; MG/1
1 TABLET, EXTENDED RELEASE ORAL DAILY PRN
Qty: 10 TABLET | Refills: 0 | Status: SHIPPED | OUTPATIENT
Start: 2024-01-31 | End: 2024-02-10

## 2024-01-31 NOTE — PROGRESS NOTES
"Subjective:      Patient ID: Juice Burk is a 23 y.o. male.    Vitals:  height is 6' 1" (1.854 m) and weight is 77.7 kg (171 lb 6.5 oz). His oral temperature is 98.6 °F (37 °C). His blood pressure is 120/76 and his pulse is 81. His respiration is 18 and oxygen saturation is 98%.     Chief Complaint: Cough    Pt presents a cough, chills, nasal congestion, body aches, sweats and sore throat that started Saturday. Pt states he has been taking nayquil and dayquil its helping a little.    Cough  This is a new problem. The current episode started in the past 7 days. The problem has been unchanged. The problem occurs every few minutes. Associated symptoms include chills, headaches, nasal congestion, postnasal drip, rhinorrhea, a sore throat and sweats. Pertinent negatives include no chest pain, ear congestion, ear pain, fever, heartburn, hemoptysis, myalgias, rash, shortness of breath, weight loss or wheezing. He has tried OTC cough suppressant (nyquil and dayquil) for the symptoms. The treatment provided mild relief.       Constitution: Positive for chills. Negative for fever.   HENT:  Positive for postnasal drip and sore throat. Negative for ear pain.    Cardiovascular:  Negative for chest pain.   Respiratory:  Positive for cough. Negative for bloody sputum, shortness of breath and wheezing.    Gastrointestinal:  Negative for heartburn.   Musculoskeletal:  Negative for muscle ache.   Skin:  Negative for rash.   Neurological:  Positive for headaches.      Objective:     Physical Exam   Constitutional: He is oriented to person, place, and time. He appears well-developed. He is cooperative.  Non-toxic appearance. He does not appear ill. No distress.   HENT:   Head: Normocephalic and atraumatic.   Ears:   Right Ear: Hearing, tympanic membrane, external ear and ear canal normal.   Left Ear: Hearing, tympanic membrane, external ear and ear canal normal.   Nose: Nose normal. No mucosal edema, rhinorrhea or nasal deformity. No " epistaxis. Right sinus exhibits no maxillary sinus tenderness and no frontal sinus tenderness. Left sinus exhibits no maxillary sinus tenderness and no frontal sinus tenderness.   Mouth/Throat: Uvula is midline, oropharynx is clear and moist and mucous membranes are normal. No trismus in the jaw. Normal dentition. No uvula swelling. No oropharyngeal exudate, posterior oropharyngeal edema or posterior oropharyngeal erythema.   Eyes: Conjunctivae and lids are normal. No scleral icterus.   Neck: Trachea normal and phonation normal. Neck supple. No edema present. No erythema present. No neck rigidity present.   Cardiovascular: Normal rate, regular rhythm, normal heart sounds and normal pulses.   Pulmonary/Chest: Effort normal and breath sounds normal. No respiratory distress. He has no decreased breath sounds. He has no rhonchi.   Abdominal: Normal appearance.   Musculoskeletal: Normal range of motion.         General: No deformity. Normal range of motion.   Neurological: He is alert and oriented to person, place, and time. He exhibits normal muscle tone. Coordination normal.   Skin: Skin is warm, dry, intact, not diaphoretic and not pale.   Psychiatric: His speech is normal and behavior is normal. Judgment and thought content normal.   Nursing note and vitals reviewed.      Assessment:     1. URI with cough and congestion    2. Cough, unspecified type        Plan:     Results for orders placed or performed in visit on 01/31/24   POCT Influenza A/B MOLECULAR   Result Value Ref Range    POC Molecular Influenza A Ag Negative Negative, Not Reported    POC Molecular Influenza B Ag Negative Negative, Not Reported     Acceptable Yes    SARS Coronavirus 2 Antigen, POCT Manual Read   Result Value Ref Range    SARS Coronavirus 2 Antigen Negative Negative     Acceptable Yes        URI with cough and congestion  -     pseudoephedrine-guaiFENesin  mg (MUCINEX D)  mg per tablet; Take 1  tablet by mouth daily as needed for Congestion (cough and congestion).  Dispense: 10 tablet; Refill: 0  -     promethazine-dextromethorphan (PROMETHAZINE-DM) 6.25-15 mg/5 mL Syrp; Take 5 mLs by mouth nightly as needed (cough).  Dispense: 120 mL; Refill: 0    Cough, unspecified type  -     POCT Influenza A/B MOLECULAR  -     SARS Coronavirus 2 Antigen, POCT Manual Read  -     promethazine-dextromethorphan (PROMETHAZINE-DM) 6.25-15 mg/5 mL Syrp; Take 5 mLs by mouth nightly as needed (cough).  Dispense: 120 mL; Refill: 0        Patient Instructions   Please drink plenty of fluids.  Please get plenty of rest.  Please return here or go to the Emergency Department for any concerns or worsening of condition.  If you were prescribed a narcotic medication, do not drive or operate heavy equipment or machinery while taking these medications.  If you do not have Hypertension or any history of palpitations, it is ok to take over the counter Sudafed or Mucinex D or Allegra-D or Claritin-D or Zyrtec-D.  If you do take one of the above, it is ok to combine that with plain over the counter Mucinex or Allegra or Claritin or Zyrtec.  If for example you are taking Zyrtec -D, you can combine that with Mucinex, but not Mucinex-D.  If you are taking Mucinex-D, you can combine that with plain Allegra or Claritin or Zyrtec.   If you do have Hypertension or palpitations, it is safe to take Coricidin HBP for relief of sinus symptoms.  If not allergic, please take over the counter Tylenol (Acetaminophen) and/or Motrin (Ibuprofen) as directed for control of pain and/or fever.  Please follow up with your primary care doctor or specialist as needed.    If you  smoke, please stop smoking.

## 2024-01-31 NOTE — LETTER
January 31, 2024      [facility]  [address] [address]  Phone: [phone]  Fax: [phone]       Patient: Juice Burk   YOB: 2000  Date of Visit: 01/31/2024    To Whom It May Concern:    Jenelle Burk  was at [facility] on 01/31/2024. Please excuse from missed school on 1/31/24. If you have any questions or concerns, or if I can be of further assistance, please do not hesitate to contact me.    Sincerely,          Mckenzie Mcmillan NP

## 2024-04-25 ENCOUNTER — OFFICE VISIT (OUTPATIENT)
Dept: ALLERGY | Facility: CLINIC | Age: 24
End: 2024-04-25
Payer: COMMERCIAL

## 2024-04-25 ENCOUNTER — LAB VISIT (OUTPATIENT)
Dept: LAB | Facility: HOSPITAL | Age: 24
End: 2024-04-25
Attending: UROLOGY
Payer: COMMERCIAL

## 2024-04-25 VITALS — HEIGHT: 73 IN | BODY MASS INDEX: 22.29 KG/M2 | WEIGHT: 168.19 LBS

## 2024-04-25 DIAGNOSIS — J31.0 CHRONIC RHINITIS: ICD-10-CM

## 2024-04-25 DIAGNOSIS — K21.9 GASTROESOPHAGEAL REFLUX DISEASE, UNSPECIFIED WHETHER ESOPHAGITIS PRESENT: Primary | ICD-10-CM

## 2024-04-25 DIAGNOSIS — L50.1 CHRONIC IDIOPATHIC URTICARIA: ICD-10-CM

## 2024-04-25 PROCEDURE — 86003 ALLG SPEC IGE CRUDE XTRC EA: CPT | Mod: 59 | Performed by: STUDENT IN AN ORGANIZED HEALTH CARE EDUCATION/TRAINING PROGRAM

## 2024-04-25 PROCEDURE — 99999 PR PBB SHADOW E&M-EST. PATIENT-LVL III: CPT | Mod: PBBFAC,,, | Performed by: STUDENT IN AN ORGANIZED HEALTH CARE EDUCATION/TRAINING PROGRAM

## 2024-04-25 PROCEDURE — 36415 COLL VENOUS BLD VENIPUNCTURE: CPT | Performed by: STUDENT IN AN ORGANIZED HEALTH CARE EDUCATION/TRAINING PROGRAM

## 2024-04-25 PROCEDURE — 86003 ALLG SPEC IGE CRUDE XTRC EA: CPT | Performed by: STUDENT IN AN ORGANIZED HEALTH CARE EDUCATION/TRAINING PROGRAM

## 2024-04-25 PROCEDURE — 99214 OFFICE O/P EST MOD 30 MIN: CPT | Mod: S$GLB,,, | Performed by: STUDENT IN AN ORGANIZED HEALTH CARE EDUCATION/TRAINING PROGRAM

## 2024-04-25 RX ORDER — MINERAL OIL
180 ENEMA (ML) RECTAL 2 TIMES DAILY
Qty: 60 TABLET | Refills: 11 | Status: SHIPPED | OUTPATIENT
Start: 2024-04-25 | End: 2025-04-25

## 2024-04-25 NOTE — PROGRESS NOTES
ALLERGY & [facility]MMUNOLOGY CLINIC   HISTORY OF PRESENT ILLNESS   Referral from: No ref. provider found  CC:   Chief Complaint   Patient presents with    Urticaria     HPI: Juice Burk is a 23 y.o. male    Taking allegra as needed, every week or so  Right now with red itchy blotchy on chest no hives  But has photos of dermatographia of shoulder  Happens in the morning sometimes  Is very random  Happens very often now that off antihistamines  On left cheek had hive  When wakes up is congested, on most days  Sometimes it affects his breathing as so congested  Has has GERD with hiatial hernia    Drug Allergies: Review of patient's allergies indicates:  No Known Allergies      MEDICAL HISTORY   MedHx:   There is no problem list on file for this patient.      SurgHx:  No past surgical history on file.    Medications:   Current Outpatient Medications on File Prior to Visit   Medication Sig Dispense Refill    (Magic mouthwash) 1:1:1 diphenhydramine(Benadryl) 12.5mg/5ml liq, aluminum & magnesium hydroxide-simethicone (Maalox), LIDOcaine viscous 2% Swish and spit 5 mLs every 6 (six) hours as needed (mouth/throat pain). (Patient not taking: Reported on 1/31/2024) 90 mL 0    carbamide peroxide (DEBROX) 6.5 % otic solution Place 5 drops into both ears as needed (Ear wax impaction). May use 2 times per day up to 4 days. (Patient not taking: Reported on 1/31/2024) 15 mL 0    fexofenadine (ALLEGRA) 180 MG tablet Take 1 tablet (180 mg total) by mouth 2 (two) times a day. (Patient not taking: Reported on 1/31/2024) 60 tablet 11    fluticasone propionate (FLONASE) 50 mcg/actuation nasal spray 2 sprays (100 mcg total) by Each Nostril route once daily. (Patient not taking: Reported on 1/31/2024) 9.9 mL 0    ibuprofen (ADVIL,MOTRIN) 800 MG tablet Take 1 tablet (800 mg total) by mouth every 6 (six) hours as needed for Pain. (Patient not taking: Reported on 1/31/2024) 30 tablet 0    tamsulosin (FLOMAX) 0.4 mg Cap Take 1 capsule (0.4 mg total)  "by mouth once daily. (Patient not taking: Reported on 1/31/2024) 90 capsule 0     No current facility-administered medications on file prior to visit.      PHYSICAL EXAM     VS: Ht 6' 1" (1.854 m)   Wt 76.3 kg (168 lb 3.4 oz)   BMI 22.19 kg/m²   GENERAL: NAD, well nourished, well appearing  EYES: no conjunctival injection, no discharge, no infraorbital shiners  EARS: external auditory canals normal B/L  ORAL: MMM  LUNGS: no increased WOB  EXTREMITIES: no cyanosis, no clubbing  DERM: +photos of dermatographia     ASSESSMENT & PLAN     Juice Burk is a 23 y.o. male with     Chronic idiopathic/spontaneous urticaria (CSU)  - Could be post-infectious immune accident, although 95% idiopathic  - fexofenadine (ALLEGRA) 180 MG tablet; Take 1 tablet (180 mg total) by mouth 2 (two) times a day.  Dispense: 60 tablet; Refill: 11  - Reviewed xolair, not needed at this time    Dermatographia  - Often associated with CSU    Chronic rhinitis  - Amb alg testing ordered  - Consider azelastine, technique reviewed    GERD  - Can contribute to stubborn post nasal drip via protective reflux/reflex mechanism  - Saline rinse with distilled water daily as needed to flush thick mucus out of sinuses  - Guiafenesin (mucinex) 600mg ER twice a day with large glass of water as needed as mucolytic to thin out secretions    "

## 2024-04-28 ENCOUNTER — HOSPITAL ENCOUNTER (EMERGENCY)
Facility: OTHER | Age: 24
Discharge: HOME | End: 2024-04-28
Attending: EMERGENCY MEDICINE
Payer: COMMERCIAL

## 2024-04-28 VITALS
OXYGEN SATURATION: 99 % | TEMPERATURE: 98 F | RESPIRATION RATE: 18 BRPM | WEIGHT: 175 LBS | HEART RATE: 88 BPM | DIASTOLIC BLOOD PRESSURE: 67 MMHG | SYSTOLIC BLOOD PRESSURE: 124 MMHG | HEIGHT: 72 IN | BODY MASS INDEX: 23.7 KG/M2

## 2024-04-28 DIAGNOSIS — N50.89 SCROTAL EDEMA: ICD-10-CM

## 2024-04-28 DIAGNOSIS — R60.9 SWELLING: ICD-10-CM

## 2024-04-28 DIAGNOSIS — M79.10 MUSCLE PAIN: Primary | ICD-10-CM

## 2024-04-28 DIAGNOSIS — R07.89 TIGHTNESS IN CHEST: ICD-10-CM

## 2024-04-28 LAB
BILIRUB UR QL STRIP: NEGATIVE
CLARITY UR: CLEAR
COLOR UR: YELLOW
GLUCOSE UR QL STRIP: NEGATIVE
HCV AB SERPL QL IA: NEGATIVE
HGB UR QL STRIP: NEGATIVE
HIV 1+2 AB+HIV1 P24 AG SERPL QL IA: NEGATIVE
KETONES UR QL STRIP: NEGATIVE
LEUKOCYTE ESTERASE UR QL STRIP: NEGATIVE
NITRITE UR QL STRIP: NEGATIVE
PH UR STRIP: 7 [PH] (ref 5–8)
PROT UR QL STRIP: NEGATIVE
SP GR UR STRIP: 1.03 (ref 1–1.03)
URN SPEC COLLECT METH UR: NORMAL
UROBILINOGEN UR STRIP-ACNC: NEGATIVE EU/DL

## 2024-04-28 PROCEDURE — 87389 HIV-1 AG W/HIV-1&-2 AB AG IA: CPT | Performed by: EMERGENCY MEDICINE

## 2024-04-28 PROCEDURE — 86803 HEPATITIS C AB TEST: CPT | Performed by: EMERGENCY MEDICINE

## 2024-04-28 PROCEDURE — 87591 N.GONORRHOEAE DNA AMP PROB: CPT | Performed by: EMERGENCY MEDICINE

## 2024-04-28 PROCEDURE — 81003 URINALYSIS AUTO W/O SCOPE: CPT | Performed by: EMERGENCY MEDICINE

## 2024-04-28 PROCEDURE — 99285 EMERGENCY DEPT VISIT HI MDM: CPT | Mod: 25

## 2024-04-28 PROCEDURE — 25000003 PHARM REV CODE 250: Performed by: EMERGENCY MEDICINE

## 2024-04-28 RX ORDER — KETOROLAC TROMETHAMINE 10 MG/1
10 TABLET, FILM COATED ORAL
Status: COMPLETED | OUTPATIENT
Start: 2024-04-28 | End: 2024-04-28

## 2024-04-28 RX ORDER — ACETAMINOPHEN 500 MG
1000 TABLET ORAL EVERY 6 HOURS PRN
Qty: 50 TABLET | Refills: 0 | Status: SHIPPED | OUTPATIENT
Start: 2024-04-28

## 2024-04-28 RX ORDER — IBUPROFEN 600 MG/1
600 TABLET ORAL EVERY 6 HOURS PRN
Qty: 20 TABLET | Refills: 0 | Status: SHIPPED | OUTPATIENT
Start: 2024-04-28

## 2024-04-28 RX ORDER — CYCLOBENZAPRINE HCL 10 MG
5 TABLET ORAL 2 TIMES DAILY PRN
Qty: 15 TABLET | Refills: 0 | Status: SHIPPED | OUTPATIENT
Start: 2024-04-28 | End: 2024-05-13

## 2024-04-28 RX ADMIN — KETOROLAC TROMETHAMINE 10 MG: 10 TABLET, FILM COATED ORAL at 07:04

## 2024-04-28 NOTE — ED TRIAGE NOTES
"PT arrived with c/o L upper back pain x 1 month.  Denies any recent falls or trauma.  Denies taking any OTC meds for symptoms.    Pt also c/o "swelling to area behind testicles" x 2 days.  Pt reports itching to site 2 days ago, but not presently.  Pt denies any rash.  Reports one episodes of "gooey clear discharge" when urinating 3-4 days ago, denies any other urinary symptoms.    Pt answering questions appropriately, speaking in complete sentences, respirations even and unlabored.  Aao x 4.  "

## 2024-04-28 NOTE — ED PROVIDER NOTES
"Encounter Date: 4/28/2024       History     Chief Complaint   Patient presents with    Back Pain     Pt reports onset of intermittent pain to L upper back x ~1 month, no injury    Groin Pain     Pt also reporting some "inflammation" in testicles x 2 days, denies urinary s/s     This is a pleasant 24 yo man with a hx of previous urethritis presenting for evaluation of scrotal discomfort and a pulling sensation in his left lat. He denies any fevers, chills or trauma. He was sexually active roughly a month prior. He denies dysuria. He does note some constipation and discomfort with BM over the last few days. He denies fevers or chills. He has a previously scheduled appointment with urology in the coming week. He has not taken anything to help with this discomfort.       Review of patient's allergies indicates:  No Known Allergies  Past Medical History:   Diagnosis Date    Asthma     Esophageal hernia     Gastritis      Past Surgical History:   Procedure Laterality Date    SHOULDER SURGERY Right     rotator cuff; shoulder dislocations x 3     Family History   Problem Relation Name Age of Onset    No Known Problems Mother      Eczema Father       Social History     Tobacco Use    Smoking status: Some Days     Types: Cigarettes     Passive exposure: Past   Substance Use Topics    Alcohol use: Yes    Drug use: Never     Review of Systems  Constitutional-no fever  HEENT-no congestion  Eyes-no redness  Respiratory-no shortness of breath  Cardio-no chest pain  GI-no abdominal pain  Endocrine-no cold intolerance  -+ scrotal discomfort  MSK-+ myalgias  Skin-no rashes  Allergy-no environmental allergy  Neurologic-, no headache  Hematology-no swollen nodes  Behavioral-no confusion   Physical Exam     Initial Vitals [04/28/24 1740]   BP Pulse Resp Temp SpO2   116/67 95 20 98 °F (36.7 °C) 97 %      MAP       --         Physical Exam  Constitutional: well appearing 24 yo man in no obvious distress  Eyes: Conjunctivae " normal.  ENT       Head: Normocephalic, atraumatic.       Nose: Normal external appearance        Mouth/Throat: no strigulous respirations   Hematological/Lymphatic/Immunilogical: no visible lymphadenopathy   Cardiovascular: Normal rate,   Respiratory: Normal respiratory effort.   Gastrointestinal: non distended   - normal external appearance, no scrotal swelling, intact cremasteric reflex   Musculoskeletal: Normal range of motion in all extremities.minor TTP in the left infraspinatus   Neurologic: Alert, oriented. Normal speech and language. No gross focal neurologic deficits are appreciated.  Skin: Skin is warm, dry. No rash noted.  Psychiatric: Mood and affect are normal.    ED Course   Procedures  Labs Reviewed   HIV 1 / 2 ANTIBODY    Narrative:     Release to patient->Immediate   HEPATITIS C ANTIBODY    Narrative:     Release to patient->Immediate   URINALYSIS, REFLEX TO URINE CULTURE    Narrative:     Specimen Source->Urine   C. TRACHOMATIS/N. GONORRHOEAE BY AMP DNA          Imaging Results              US Scrotum And Testicles (Final result)  Result time 04/28/24 19:10:21      Final result by Chante Lieberman MD (04/28/24 19:10:21)                   Impression:      Thickening of the posterior scrotal wall, which may be related to scrotal edema.  No intra testicular masses.      Electronically signed by: Chante Lieberman  Date:    04/28/2024  Time:    19:10               Narrative:    EXAMINATION:  TESTICULAR ULTRASOUND WITH DOPPLER ANALYSIS    CLINICAL HISTORY:  Edema, unspecified    TECHNIQUE:  Real-time testicular ultrasound was performed. Color and pulse Doppler imaging was utilized.    COMPARISON:  None.    FINDINGS:  The posterior scrotal wall is thickened.    The right testicle is homogeneous in echotexture and normal in size measuring 4.4 x 2.1 x 3.1 cm. There are no intratesticular masses. There is normal color flow seen to the right testicle.    There is no right hydrocele or varicocele.    The  left testicle is homogeneous in echotexture and normal in size measuring 83.9 x 2 x 2.8 cm. There is normal color flow seen to the left testicle.    There is no left hydrocele or varicocele.    Color Doppler imaging demonstrates  blood flow in both testes. Pulse imaging demonstrates arterial and venous  waveforms.                                       X-Ray Chest PA And Lateral (Final result)  Result time 04/28/24 19:03:27      Final result by Luis Enrique Strickland MD (04/28/24 19:03:27)                   Impression:      No acute process.      Electronically signed by: Luis Enrique Strickland MD  Date:    04/28/2024  Time:    19:03               Narrative:    EXAMINATION:  XR CHEST PA AND LATERAL    CLINICAL HISTORY:  Other chest pain    TECHNIQUE:  PA and lateral views of the chest were performed.    COMPARISON:  None    FINDINGS:  The trachea is unremarkable.  The cardiomediastinal silhouette is within normal limits.  The hilar structures are unremarkable.  There is no evidence of free air beneath the hemidiaphragms.  There are no pleural effusions.  There is no evidence of a pneumothorax.  There is no evidence of pneumomediastinum.  No airspace opacity is present.  The osseous structures are unremarkable.                                    X-Rays:   Independently Interpreted Readings:   Other Readings:  Cxr- no pneumothorax, no infiltrate    Medications   ketorolac tablet 10 mg (10 mg Oral Given 4/28/24 1924)     Medical Decision Making  Ddx- urethritis, hydrocele, vericocele, torsion, pneumothorax, pneumonia, msk strain        Problems Addressed:  Muscle pain: acute illness or injury  Scrotal edema: acute illness or injury  Swelling: acute illness or injury  Tightness in chest: acute illness or injury    Amount and/or Complexity of Data Reviewed  External Data Reviewed: labs and notes.     Details: Hx of reflux and dysuria  Labs: ordered. Decision-making details documented in ED Course.  Radiology: ordered and independent  interpretation performed. Decision-making details documented in ED Course.    Risk  OTC drugs.  Prescription drug management.                                      Clinical Impression:  Final diagnoses:  [R07.89] Tightness in chest  [R60.9] Swelling  [M79.10] Muscle pain (Primary)  [N50.89] Scrotal edema          ED Disposition Condition    Discharge Stable          ED Prescriptions       Medication Sig Dispense Start Date End Date Auth. Provider    acetaminophen (TYLENOL) 500 MG tablet Take 2 tablets (1,000 mg total) by mouth every 6 (six) hours as needed. 50 tablet 4/28/2024 -- Waqas Porras MD    ibuprofen (ADVIL,MOTRIN) 600 MG tablet Take 1 tablet (600 mg total) by mouth every 6 (six) hours as needed for Pain. 20 tablet 4/28/2024 -- Waqas Porras MD    cyclobenzaprine (FLEXERIL) 10 MG tablet Take 0.5 tablets (5 mg total) by mouth 2 (two) times daily as needed for Muscle spasms. 15 tablet 4/28/2024 5/13/2024 Waqas Porras MD          Follow-up Information       Follow up With Specialties Details Why Contact Info    Brianna Freed NP Urology Go on 5/2/2024 For a follow up visit about today [address] [place] [id number]  [phone]               Waqas Porras MD  04/29/24 [id number]

## 2024-04-29 NOTE — DISCHARGE INSTRUCTIONS
Your Chest xray is normal, your pain seems to be muscular. Use the medications prescribed to help with this as needed.    Your scrotum had some mild swelling but no infection of blockages, occasionally this edema could be from increased pressure or constipation.   Make sure you use a bowel regimen to keep your stool soft. See the urologist for further evaluation.    Mr. Burk,    Thank you for letting me care for you today! It was nice meeting you, and I hope you feel better soon.   If you would like access to your chart and what was done today please utilize the [facility].   Please come back to [facility] for all of your future medical needs.    Our goal in the emergency department is to always give you outstanding care and exceptional service. You may receive a survey by mail or e-mail in the next week regarding your experience in our ED. We would greatly appreciate you completing and returning the survey. Your feedback provides us with a way to recognize our staff who give very good care and it helps us learn how to improve when your experience was below our aspiration of excellence.     Sincerely,    Waqas Porras MD  Board Certified Emergency Physician

## 2024-04-30 LAB
A ALTERNATA IGE QN: <0.1 KU/L
A FUMIGATUS IGE QN: <0.1 KU/L
BERMUDA GRASS IGE QN: <0.1 KU/L
C TRACH DNA SPEC QL NAA+PROBE: NOT DETECTED
CAT DANDER IGE QN: <0.1 KU/L
CEDAR IGE QN: <0.1 KU/L
D FARINAE IGE QN: <0.1 KU/L
D PTERONYSS IGE QN: <0.1 KU/L
DEPRECATED A ALTERNATA IGE RAST QL: NORMAL
DEPRECATED A FUMIGATUS IGE RAST QL: NORMAL
DEPRECATED BERMUDA GRASS IGE RAST QL: NORMAL
DEPRECATED CAT DANDER IGE RAST QL: NORMAL
DEPRECATED CEDAR IGE RAST QL: NORMAL
DEPRECATED D FARINAE IGE RAST QL: NORMAL
DEPRECATED D PTERONYSS IGE RAST QL: NORMAL
DEPRECATED DOG DANDER IGE RAST QL: NORMAL
DEPRECATED ELDER IGE RAST QL: NORMAL
DEPRECATED ENGL PLANTAIN IGE RAST QL: NORMAL
DEPRECATED PECAN/HICK TREE IGE RAST QL: NORMAL
DEPRECATED ROACH IGE RAST QL: NORMAL
DEPRECATED TIMOTHY IGE RAST QL: NORMAL
DEPRECATED WEST RAGWEED IGE RAST QL: NORMAL
DEPRECATED WHITE OAK IGE RAST QL: NORMAL
DOG DANDER IGE QN: <0.1 KU/L
ELDER IGE QN: <0.1 KU/L
ENGL PLANTAIN IGE QN: <0.1 KU/L
N GONORRHOEA DNA SPEC QL NAA+PROBE: NOT DETECTED
PECAN/HICK TREE IGE QN: <0.1 KU/L
ROACH IGE QN: <0.1 KU/L
TIMOTHY IGE QN: <0.1 KU/L
WEST RAGWEED IGE QN: <0.1 KU/L
WHITE OAK IGE QN: <0.1 KU/L